# Patient Record
Sex: FEMALE | Race: WHITE | Employment: STUDENT | URBAN - METROPOLITAN AREA
[De-identification: names, ages, dates, MRNs, and addresses within clinical notes are randomized per-mention and may not be internally consistent; named-entity substitution may affect disease eponyms.]

---
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
## 2021-05-08 ENCOUNTER — IMMUNIZATION (OUTPATIENT)
Dept: LAB | Facility: HOSPITAL | Age: 21
End: 2021-05-08
Attending: EMERGENCY MEDICINE
Payer: COMMERCIAL

## 2021-05-08 DIAGNOSIS — Z23 NEED FOR VACCINATION: Primary | ICD-10-CM

## 2021-05-08 PROCEDURE — 0001A SARSCOV2 VAC 30MCG/0.3ML IM: CPT

## 2021-05-29 ENCOUNTER — IMMUNIZATION (OUTPATIENT)
Dept: LAB | Facility: HOSPITAL | Age: 21
End: 2021-05-29
Attending: EMERGENCY MEDICINE
Payer: COMMERCIAL

## 2021-05-29 DIAGNOSIS — Z23 NEED FOR VACCINATION: Primary | ICD-10-CM

## 2021-05-29 PROCEDURE — 0002A SARSCOV2 VAC 30MCG/0.3ML IM: CPT

## 2021-06-14 ENCOUNTER — TELEPHONE (OUTPATIENT)
Dept: OBGYN CLINIC | Facility: CLINIC | Age: 21
End: 2021-06-14

## 2021-06-14 NOTE — TELEPHONE ENCOUNTER
Mom calling and says Pilonidal cyst is back  Advised she is 21 years now and should find an adult surgeon    Please advise if something different than that?

## 2021-06-17 ENCOUNTER — OFFICE VISIT (OUTPATIENT)
Dept: SURGERY | Facility: CLINIC | Age: 21
End: 2021-06-17
Payer: COMMERCIAL

## 2021-06-17 VITALS
TEMPERATURE: 98 F | HEART RATE: 90 BPM | SYSTOLIC BLOOD PRESSURE: 105 MMHG | HEIGHT: 62 IN | BODY MASS INDEX: 19.32 KG/M2 | DIASTOLIC BLOOD PRESSURE: 75 MMHG | WEIGHT: 105 LBS

## 2021-06-17 DIAGNOSIS — L05.91 PILONIDAL CYST: ICD-10-CM

## 2021-06-17 DIAGNOSIS — L98.8 PILONIDAL DISEASE: Primary | ICD-10-CM

## 2021-06-17 PROCEDURE — 3078F DIAST BP <80 MM HG: CPT | Performed by: SURGERY

## 2021-06-17 PROCEDURE — 3074F SYST BP LT 130 MM HG: CPT | Performed by: SURGERY

## 2021-06-17 PROCEDURE — 99244 OFF/OP CNSLTJ NEW/EST MOD 40: CPT | Performed by: SURGERY

## 2021-06-17 PROCEDURE — 3008F BODY MASS INDEX DOCD: CPT | Performed by: SURGERY

## 2021-06-17 RX ORDER — CEPHALEXIN 500 MG/1
CAPSULE ORAL
COMMUNITY

## 2021-06-17 NOTE — H&P
New Patient Visit Note       Active Problems      1. Pilonidal disease    2. Pilonidal cyst        Chief Complaint   Patient presents with:  Pilonidal Cyst: NP pilonidal cyst- PT states has a hx of pilonidal cyst, last one was 2 years ago.  PT states last w for pain, discharge, redness and visual disturbance. Respiratory: Negative for cough, chest tightness, shortness of breath and wheezing. Cardiovascular: Negative for chest pain, palpitations and leg swelling.    Gastrointestinal: Negative for abdominal Tenderness: There is no abdominal tenderness. There is no guarding or rebound. Musculoskeletal:         General: No deformity. Normal range of motion. Cervical back: Normal range of motion and neck supple. No rigidity.       Right lower leg: No edema encouraged to avoid prolonged sitting and other activities which may aggravate this discomfort.   The pain may be related to scar tissue or just prolonged sitting    No indication for general surgical intervention at this time    Assessment  Pilonidal disea

## 2022-01-12 ENCOUNTER — IMMUNIZATION (OUTPATIENT)
Dept: LAB | Facility: HOSPITAL | Age: 22
End: 2022-01-12
Attending: EMERGENCY MEDICINE
Payer: COMMERCIAL

## 2022-01-12 DIAGNOSIS — Z23 NEED FOR VACCINATION: Primary | ICD-10-CM

## 2022-01-12 PROCEDURE — 0004A SARSCOV2 VAC 30MCG/0.3ML IM: CPT | Performed by: NURSE PRACTITIONER

## 2022-01-12 PROCEDURE — 0054A SARSCOV2 VAC 30MCG/0.3ML IM: CPT | Performed by: NURSE PRACTITIONER

## 2022-07-02 ENCOUNTER — OFFICE VISIT (OUTPATIENT)
Dept: FAMILY MEDICINE CLINIC | Facility: CLINIC | Age: 22
End: 2022-07-02
Payer: COMMERCIAL

## 2022-07-02 VITALS
DIASTOLIC BLOOD PRESSURE: 66 MMHG | HEART RATE: 64 BPM | SYSTOLIC BLOOD PRESSURE: 90 MMHG | TEMPERATURE: 99 F | RESPIRATION RATE: 16 BRPM

## 2022-07-02 DIAGNOSIS — L30.9 DERMATITIS: Primary | ICD-10-CM

## 2022-07-02 PROCEDURE — 3078F DIAST BP <80 MM HG: CPT | Performed by: NURSE PRACTITIONER

## 2022-07-02 PROCEDURE — 3074F SYST BP LT 130 MM HG: CPT | Performed by: NURSE PRACTITIONER

## 2022-07-02 PROCEDURE — 99213 OFFICE O/P EST LOW 20 MIN: CPT | Performed by: NURSE PRACTITIONER

## 2022-07-02 RX ORDER — TRIAMCINOLONE ACETONIDE 5 MG/G
OINTMENT TOPICAL
Qty: 15 G | Refills: 1 | Status: SHIPPED | OUTPATIENT
Start: 2022-07-02

## 2022-07-02 RX ORDER — KETOCONAZOLE 20 MG/G
CREAM TOPICAL
Qty: 15 G | Refills: 0 | Status: SHIPPED | OUTPATIENT
Start: 2022-07-02

## 2023-07-31 ENCOUNTER — OFFICE VISIT (OUTPATIENT)
Dept: FAMILY MEDICINE CLINIC | Facility: CLINIC | Age: 23
End: 2023-07-31
Payer: COMMERCIAL

## 2023-07-31 VITALS
RESPIRATION RATE: 16 BRPM | WEIGHT: 110 LBS | BODY MASS INDEX: 20.24 KG/M2 | HEIGHT: 62 IN | OXYGEN SATURATION: 100 % | HEART RATE: 70 BPM | SYSTOLIC BLOOD PRESSURE: 103 MMHG | DIASTOLIC BLOOD PRESSURE: 63 MMHG | TEMPERATURE: 99 F

## 2023-07-31 DIAGNOSIS — B08.4 HAND, FOOT AND MOUTH DISEASE: Primary | ICD-10-CM

## 2023-07-31 DIAGNOSIS — J02.9 SORE THROAT: ICD-10-CM

## 2023-07-31 LAB
CONTROL LINE PRESENT WITH A CLEAR BACKGROUND (YES/NO): YES YES/NO
KIT LOT #: NORMAL NUMERIC
STREP GRP A CUL-SCR: NEGATIVE

## 2023-07-31 NOTE — PATIENT INSTRUCTIONS
Use otc pain reliever and warm saltwater gargles for sores in the throat. Take zyrtec in the day and/or benadryl at night for itching as needed. If symptoms worsen or persist, follow-up with PCP for further evaluation.

## 2024-10-31 ENCOUNTER — OFFICE VISIT (OUTPATIENT)
Dept: FAMILY MEDICINE CLINIC | Facility: CLINIC | Age: 24
End: 2024-10-31
Payer: COMMERCIAL

## 2024-10-31 ENCOUNTER — LAB ENCOUNTER (OUTPATIENT)
Dept: LAB | Age: 24
End: 2024-10-31
Attending: STUDENT IN AN ORGANIZED HEALTH CARE EDUCATION/TRAINING PROGRAM
Payer: COMMERCIAL

## 2024-10-31 VITALS
SYSTOLIC BLOOD PRESSURE: 106 MMHG | WEIGHT: 107 LBS | BODY MASS INDEX: 19.69 KG/M2 | HEIGHT: 62 IN | RESPIRATION RATE: 14 BRPM | HEART RATE: 66 BPM | DIASTOLIC BLOOD PRESSURE: 68 MMHG

## 2024-10-31 DIAGNOSIS — R42 LIGHT HEADEDNESS: ICD-10-CM

## 2024-10-31 DIAGNOSIS — Z00.00 ENCOUNTER FOR ROUTINE HISTORY AND PHYSICAL EXAMINATION: Primary | ICD-10-CM

## 2024-10-31 DIAGNOSIS — Z00.00 LABORATORY EXAM ORDERED AS PART OF ROUTINE GENERAL MEDICAL EXAMINATION: ICD-10-CM

## 2024-10-31 DIAGNOSIS — R73.01 ELEVATED FASTING GLUCOSE: ICD-10-CM

## 2024-10-31 LAB
ALBUMIN SERPL-MCNC: 4.7 G/DL (ref 3.2–4.8)
ALBUMIN/GLOB SERPL: 2 {RATIO} (ref 1–2)
ALP LIVER SERPL-CCNC: 81 U/L
ALT SERPL-CCNC: 14 U/L
ANION GAP SERPL CALC-SCNC: 9 MMOL/L (ref 0–18)
AST SERPL-CCNC: 20 U/L (ref ?–34)
BASOPHILS # BLD AUTO: 0.02 X10(3) UL (ref 0–0.2)
BASOPHILS NFR BLD AUTO: 0.3 %
BILIRUB SERPL-MCNC: 0.7 MG/DL (ref 0.3–1.2)
BUN BLD-MCNC: 12 MG/DL (ref 9–23)
CALCIUM BLD-MCNC: 10.1 MG/DL (ref 8.7–10.4)
CHLORIDE SERPL-SCNC: 109 MMOL/L (ref 98–112)
CHOLEST SERPL-MCNC: 117 MG/DL (ref ?–200)
CO2 SERPL-SCNC: 23 MMOL/L (ref 21–32)
CREAT BLD-MCNC: 1.01 MG/DL
EGFRCR SERPLBLD CKD-EPI 2021: 80 ML/MIN/1.73M2 (ref 60–?)
EOSINOPHIL # BLD AUTO: 0.1 X10(3) UL (ref 0–0.7)
EOSINOPHIL NFR BLD AUTO: 1.5 %
ERYTHROCYTE [DISTWIDTH] IN BLOOD BY AUTOMATED COUNT: 12.5 %
FASTING PATIENT LIPID ANSWER: YES
FASTING STATUS PATIENT QL REPORTED: YES
GLOBULIN PLAS-MCNC: 2.4 G/DL (ref 2–3.5)
GLUCOSE BLD-MCNC: 116 MG/DL (ref 70–99)
HCT VFR BLD AUTO: 39.8 %
HDLC SERPL-MCNC: 52 MG/DL (ref 40–59)
HGB BLD-MCNC: 14.2 G/DL
IMM GRANULOCYTES # BLD AUTO: 0.02 X10(3) UL (ref 0–1)
IMM GRANULOCYTES NFR BLD: 0.3 %
LDLC SERPL CALC-MCNC: 52 MG/DL (ref ?–100)
LYMPHOCYTES # BLD AUTO: 2.05 X10(3) UL (ref 1–4)
LYMPHOCYTES NFR BLD AUTO: 29.8 %
MCH RBC QN AUTO: 31.1 PG (ref 26–34)
MCHC RBC AUTO-ENTMCNC: 35.7 G/DL (ref 31–37)
MCV RBC AUTO: 87.1 FL
MONOCYTES # BLD AUTO: 0.56 X10(3) UL (ref 0.1–1)
MONOCYTES NFR BLD AUTO: 8.1 %
NEUTROPHILS # BLD AUTO: 4.14 X10 (3) UL (ref 1.5–7.7)
NEUTROPHILS # BLD AUTO: 4.14 X10(3) UL (ref 1.5–7.7)
NEUTROPHILS NFR BLD AUTO: 60 %
NONHDLC SERPL-MCNC: 65 MG/DL (ref ?–130)
OSMOLALITY SERPL CALC.SUM OF ELEC: 293 MOSM/KG (ref 275–295)
PLATELET # BLD AUTO: 240 10(3)UL (ref 150–450)
POTASSIUM SERPL-SCNC: 4.2 MMOL/L (ref 3.5–5.1)
PROT SERPL-MCNC: 7.1 G/DL (ref 5.7–8.2)
RBC # BLD AUTO: 4.57 X10(6)UL
SODIUM SERPL-SCNC: 141 MMOL/L (ref 136–145)
TRIGL SERPL-MCNC: 61 MG/DL (ref 30–149)
TSI SER-ACNC: 1.76 MIU/ML (ref 0.55–4.78)
VLDLC SERPL CALC-MCNC: 9 MG/DL (ref 0–30)
WBC # BLD AUTO: 6.9 X10(3) UL (ref 4–11)

## 2024-10-31 PROCEDURE — 80061 LIPID PANEL: CPT | Performed by: STUDENT IN AN ORGANIZED HEALTH CARE EDUCATION/TRAINING PROGRAM

## 2024-10-31 PROCEDURE — 80050 GENERAL HEALTH PANEL: CPT | Performed by: STUDENT IN AN ORGANIZED HEALTH CARE EDUCATION/TRAINING PROGRAM

## 2024-10-31 RX ORDER — DESONIDE 0.5 MG/G
OINTMENT TOPICAL
COMMUNITY
Start: 2024-10-30

## 2024-10-31 NOTE — PROGRESS NOTES
[facility]    CC: yearly exam     HPI: Yari Weeks is 24 year old female here for a yearly physical.    Also would like to discuss the following:    .Episodes of lightheadedness. Occasional and intermittent. Has been going on for >10 years. 1-2x/month. After lying down for a little while symptoms resolve. She feels this occurs when she is on her menstrual cycle. Sometimes happens after first waking up or if she has not eaten well.     PMH:  Patient Active Problem List   Diagnosis    Acute bilateral low back pain without sciatica    Upper back pain, chronic    Pilonidal disease     Past Medical History:    Back problem    mild scoliosis    Community acquired pneumonia    Pneumonia in pediatric patient    Visual impairment    Wrist sprain, right, initial encounter       Last Physical 10/31/24     SH: reviewed     FH: reviewed     Social History     Social History Narrative    Works in finance. Lives at home with Parents, brother and sister. No tobacco. Occaisional alcohol. No drug use.        ROS: full 10 point review of systems done and otherwise negative.      Healthcare Maintenance:  Pap: pt has scheduled with gyn  Declines flu  Covid19 booster recommended  Tdap recommended, declines today    Health Habits:  Smoking. declines  Alcohol Use. occasional  Dental Exam. UTD  Eye Exam. UTD     GYN:  Birth Control. abstinence  Menstrual/Menopausal Hx. Regular/manageable     PE:  Vital Signs    10/31/24 1330   BP: 106/68   Pulse: 66   Resp: 14     Wt Readings from Last 3 Encounters:   10/31/24 107 lb (48.5 kg)   07/31/23 110 lb (49.9 kg)   06/17/21 105 lb (47.6 kg)     Body mass index is 19.57 kg/m².     General: Comfortable, pleasant, NAD, appears stated age  HEENT.  NC/AT, no conjunctival injection, TMs clear, oropharynx without erythema or exudate, neck supple, no LAD or thyromegaly  CV.  RRR, no m/r/g  Pulm. CTAB, no W/R/R, comfortable work of breathing, speaks in full sentences  Abdomen.  Soft, nt, nd  .  deferred  Extremities.  2+ DP pulses, no edema  Skin.  No concerning moles      EE Lab Encounter on 10/31/2024   Component Date Value Ref Range Status    WBC 10/31/2024 6.9  4.0 - 11.0 x10(3) uL Final    RBC 10/31/2024 4.57  3.80 - 5.30 x10(6)uL Final    HGB 10/31/2024 14.2  12.0 - 16.0 g/dL Final    HCT 10/31/2024 39.8  35.0 - 48.0 % Final    PLT 10/31/2024 240.0  150.0 - 450.0 10(3)uL Final    MCV 10/31/2024 87.1  80.0 - 100.0 fL Final    MCH 10/31/2024 31.1  26.0 - 34.0 pg Final    MCHC 10/31/2024 35.7  31.0 - 37.0 g/dL Final    RDW 10/31/2024 12.5  % Final    Neutrophil Absolute Prelim 10/31/2024 4.14  1.50 - 7.70 x10 (3) uL Final    Neutrophil Absolute 10/31/2024 4.14  1.50 - 7.70 x10(3) uL Final    Lymphocyte Absolute 10/31/2024 2.05  1.00 - 4.00 x10(3) uL Final    Monocyte Absolute 10/31/2024 0.56  0.10 - 1.00 x10(3) uL Final    Eosinophil Absolute 10/31/2024 0.10  0.00 - 0.70 x10(3) uL Final    Basophil Absolute 10/31/2024 0.02  0.00 - 0.20 x10(3) uL Final    Immature Granulocyte Absolute 10/31/2024 0.02  0.00 - 1.00 x10(3) uL Final    Neutrophil % 10/31/2024 60.0  % Final    Lymphocyte % 10/31/2024 29.8  % Final    Monocyte % 10/31/2024 8.1  % Final    Eosinophil % 10/31/2024 1.5  % Final    Basophil % 10/31/2024 0.3  % Final    Immature Granulocyte % 10/31/2024 0.3  % Final    Glucose 10/31/2024 116 (H)  70 - 99 mg/dL Final    Sodium 10/31/2024 141  136 - 145 mmol/L Final    Potassium 10/31/2024 4.2  3.5 - 5.1 mmol/L Final    Chloride 10/31/2024 109  98 - 112 mmol/L Final    CO2 10/31/2024 23.0  21.0 - 32.0 mmol/L Final    Anion Gap 10/31/2024 9  0 - 18 mmol/L Final    BUN 10/31/2024 12  9 - 23 mg/dL Final    Creatinine 10/31/2024 1.01  0.55 - 1.02 mg/dL Final    Calcium, Total 10/31/2024 10.1  8.7 - 10.4 mg/dL Final    Calculated Osmolality 10/31/2024 293  275 - 295 mOsm/kg Final    eGFR-Cr 10/31/2024 80  >=60 mL/min/1.73m2 Final    AST 10/31/2024 20  <34 U/L Final    ALT 10/31/2024 14   10 - 49 U/L Final    Alkaline Phosphatase 10/31/2024 81  52 - 144 U/L Final    Bilirubin, Total 10/31/2024 0.7  0.3 - 1.2 mg/dL Final    Total Protein 10/31/2024 7.1  5.7 - 8.2 g/dL Final    Albumin 10/31/2024 4.7  3.2 - 4.8 g/dL Final    Globulin  10/31/2024 2.4  2.0 - 3.5 g/dL Final    A/G Ratio 10/31/2024 2.0  1.0 - 2.0 Final    Patient Fasting for CMP? 10/31/2024 Yes   Final    Cholesterol, Total 10/31/2024 117  <200 mg/dL Final    HDL Cholesterol 10/31/2024 52  40 - 59 mg/dL Final    Triglycerides 10/31/2024 61  30 - 149 mg/dL Final    LDL Cholesterol 10/31/2024 52  <100 mg/dL Final    VLDL 10/31/2024 9  0 - 30 mg/dL Final    Non HDL Chol 10/31/2024 65  <130 mg/dL Final    Patient Fasting for Lipid? 10/31/2024 Yes   Final    TSH 10/31/2024 1.763  0.550 - 4.780 mIU/mL Final        A/P: Yari Weeks is 24 year old yo female here for complete physical.  1. Healthcare Maintenance. Discussed eye exam, dentist visit q6 months, sunscreen, exercise at least 5x/week, 30 minutes daily, and limiting caffeine intake.     2. Intermittent light headedness - recommend eating breakfast every day, staying hydrated.   - CBC W Differential W Platelet [E]; Future  - Comp Metabolic Panel (14) [E]; Future  - Lipid Panel [E]; Future  - TSH W REFLEX TO FREE T4 [13281][Q]; Future  - Hemoglobin A1C [E]; Future     Encounter Medications[1]        Side effects, risks and benefits of medications were explained.  The patient or responsible adult showed the ability to learn, asked appropriate questions.  There were no barriers to learning and they verbalized understanding of the treatment plan.     Medication list provided to patient and /or family member.     This note was created in part by Dragon recognition software.  Please excuse errors.                 [1]   Outpatient Encounter Medications as of 10/31/2024   Medication Sig Dispense Refill    Desonide 0.05 % External Ointment Apply to affected areas twice a day for 1-2 weeks,  then hold, then restart as needed.      ketoconazole 2 % External Cream Apply to rash behind left knee tid until resolved 15 g 0    clindamycin 1 % External Solution Apply to AA of scalp BID 60 mL 2    ibuprofen (ADVIL) 200 MG Oral Tab Take 3 tablets (600 mg total) by mouth every 6 (six) hours as needed for Pain.      Multiple Vitamin (MULTI-DAY) Oral Tab Take by mouth.      [DISCONTINUED] triamcinolone 0.5 % External Ointment Apply to rash behind the left knee tid until resolved.  Do not use for more than 2 weeks. (Patient not taking: Reported on 10/31/2024) 15 g 1     No facility-administered encounter medications on file as of 10/31/2024.

## 2025-01-15 ENCOUNTER — LAB ENCOUNTER (OUTPATIENT)
Dept: LAB | Age: 25
End: 2025-01-15
Attending: STUDENT IN AN ORGANIZED HEALTH CARE EDUCATION/TRAINING PROGRAM
Payer: COMMERCIAL

## 2025-01-15 DIAGNOSIS — L73.8 SENILE SEBACEOUS GLAND HYPERPLASIA: ICD-10-CM

## 2025-01-15 DIAGNOSIS — Z79.899 NEED FOR PROPHYLACTIC CHEMOTHERAPY: Primary | ICD-10-CM

## 2025-01-15 DIAGNOSIS — Z76.89 REFERRAL OF PATIENT WITHOUT EXAMINATION OR TREATMENT: ICD-10-CM

## 2025-01-15 LAB
ALBUMIN SERPL-MCNC: 4.8 G/DL (ref 3.2–4.8)
ALBUMIN/GLOB SERPL: 1.8 {RATIO} (ref 1–2)
ALP LIVER SERPL-CCNC: 90 U/L
ALT SERPL-CCNC: 14 U/L
ANION GAP SERPL CALC-SCNC: 9 MMOL/L (ref 0–18)
AST SERPL-CCNC: 21 U/L (ref ?–34)
BILIRUB SERPL-MCNC: 0.5 MG/DL (ref 0.3–1.2)
BUN BLD-MCNC: 13 MG/DL (ref 9–23)
CALCIUM BLD-MCNC: 10 MG/DL (ref 8.7–10.6)
CHLORIDE SERPL-SCNC: 110 MMOL/L (ref 98–112)
CHOLEST SERPL-MCNC: 140 MG/DL (ref ?–200)
CO2 SERPL-SCNC: 24 MMOL/L (ref 21–32)
CREAT BLD-MCNC: 0.96 MG/DL
EGFRCR SERPLBLD CKD-EPI 2021: 85 ML/MIN/1.73M2 (ref 60–?)
FASTING PATIENT LIPID ANSWER: YES
FASTING STATUS PATIENT QL REPORTED: YES
GLOBULIN PLAS-MCNC: 2.6 G/DL (ref 2–3.5)
GLUCOSE BLD-MCNC: 91 MG/DL (ref 70–99)
HDLC SERPL-MCNC: 59 MG/DL (ref 40–59)
LDLC SERPL CALC-MCNC: 72 MG/DL (ref ?–100)
NONHDLC SERPL-MCNC: 81 MG/DL (ref ?–130)
OSMOLALITY SERPL CALC.SUM OF ELEC: 296 MOSM/KG (ref 275–295)
POTASSIUM SERPL-SCNC: 4.3 MMOL/L (ref 3.5–5.1)
PROT SERPL-MCNC: 7.4 G/DL (ref 5.7–8.2)
SODIUM SERPL-SCNC: 143 MMOL/L (ref 136–145)
TRIGL SERPL-MCNC: 39 MG/DL (ref 30–149)
VLDLC SERPL CALC-MCNC: 6 MG/DL (ref 0–30)

## 2025-01-15 PROCEDURE — 80061 LIPID PANEL: CPT

## 2025-01-15 PROCEDURE — 36415 COLL VENOUS BLD VENIPUNCTURE: CPT

## 2025-01-15 PROCEDURE — 80053 COMPREHEN METABOLIC PANEL: CPT

## 2025-02-08 ENCOUNTER — LAB ENCOUNTER (OUTPATIENT)
Dept: LAB | Age: 25
End: 2025-02-08
Attending: STUDENT IN AN ORGANIZED HEALTH CARE EDUCATION/TRAINING PROGRAM
Payer: COMMERCIAL

## 2025-02-08 DIAGNOSIS — Z76.89 REFERRAL OF PATIENT WITHOUT EXAMINATION OR TREATMENT: ICD-10-CM

## 2025-02-08 DIAGNOSIS — L57.8 NODULAR ELASTOSIS WITH CYSTS AND COMEDONES OF FAVRE AND RACOUCHOT: Primary | ICD-10-CM

## 2025-02-08 DIAGNOSIS — L70.0 NODULAR ELASTOSIS WITH CYSTS AND COMEDONES OF FAVRE AND RACOUCHOT: Primary | ICD-10-CM

## 2025-02-08 DIAGNOSIS — Z79.899 NEED FOR PROPHYLACTIC CHEMOTHERAPY: ICD-10-CM

## 2025-02-08 LAB
ALBUMIN SERPL-MCNC: 5 G/DL (ref 3.2–4.8)
ALBUMIN/GLOB SERPL: 1.9 {RATIO} (ref 1–2)
ALP LIVER SERPL-CCNC: 94 U/L
ALT SERPL-CCNC: 15 U/L
ANION GAP SERPL CALC-SCNC: 9 MMOL/L (ref 0–18)
AST SERPL-CCNC: 25 U/L (ref ?–34)
BILIRUB SERPL-MCNC: 0.8 MG/DL (ref 0.3–1.2)
BUN BLD-MCNC: 13 MG/DL (ref 9–23)
CALCIUM BLD-MCNC: 9.7 MG/DL (ref 8.7–10.6)
CHLORIDE SERPL-SCNC: 107 MMOL/L (ref 98–112)
CHOLEST SERPL-MCNC: 141 MG/DL (ref ?–200)
CO2 SERPL-SCNC: 24 MMOL/L (ref 21–32)
CREAT BLD-MCNC: 1.02 MG/DL
EGFRCR SERPLBLD CKD-EPI 2021: 79 ML/MIN/1.73M2 (ref 60–?)
FASTING PATIENT LIPID ANSWER: YES
FASTING STATUS PATIENT QL REPORTED: YES
GLOBULIN PLAS-MCNC: 2.6 G/DL (ref 2–3.5)
GLUCOSE BLD-MCNC: 94 MG/DL (ref 70–99)
HDLC SERPL-MCNC: 61 MG/DL (ref 40–59)
LDLC SERPL CALC-MCNC: 67 MG/DL (ref ?–100)
NONHDLC SERPL-MCNC: 80 MG/DL (ref ?–130)
OSMOLALITY SERPL CALC.SUM OF ELEC: 290 MOSM/KG (ref 275–295)
POTASSIUM SERPL-SCNC: 4.1 MMOL/L (ref 3.5–5.1)
PROT SERPL-MCNC: 7.6 G/DL (ref 5.7–8.2)
SODIUM SERPL-SCNC: 140 MMOL/L (ref 136–145)
TRIGL SERPL-MCNC: 65 MG/DL (ref 30–149)
VLDLC SERPL CALC-MCNC: 10 MG/DL (ref 0–30)

## 2025-02-08 PROCEDURE — 80053 COMPREHEN METABOLIC PANEL: CPT

## 2025-02-08 PROCEDURE — 36415 COLL VENOUS BLD VENIPUNCTURE: CPT

## 2025-02-08 PROCEDURE — 80061 LIPID PANEL: CPT

## (undated) DEVICE — GOWN,SIRUS,FABRIC-REINFORCED,X-LARGE: Brand: MEDLINE

## (undated) DEVICE — GAMMEX® PI HYBRID SIZE 6.5, STERILE POWDER-FREE SURGICAL GLOVE, POLYISOPRENE AND NEOPRENE BLEND: Brand: GAMMEX

## (undated) DEVICE — PUNCH DERMAL 4MM

## (undated) DEVICE — GLOVE BIOGEL M SURG SZ 7

## (undated) DEVICE — SOL  .9 1000ML BTL

## (undated) DEVICE — SPONGE STICK WITH PVP-I: Brand: KENDALL

## (undated) DEVICE — REM POLYHESIVE ADULT PATIENT RETURN ELECTRODE: Brand: VALLEYLAB

## (undated) DEVICE — ABDOMINAL PAD: Brand: DERMACEA

## (undated) DEVICE — LUBRICANT JLY SURGILUBE 2OZ

## (undated) DEVICE — GAUZE PACKING IODOFORM 1/4X5

## (undated) DEVICE — 1010 S-DRAPE TOWEL DRAPE 10/BX: Brand: STERI-DRAPE™

## (undated) DEVICE — PEDIATRIC: Brand: MEDLINE INDUSTRIES, INC.

## (undated) DEVICE — GAUZE SPONGES,12 PLY: Brand: CURITY

## (undated) NOTE — LETTER
[facility]  Lamberto Stapleton 62 [address] [facility], [address] [place] [address]    Consent for Operation    Date: __________________    Time: _______________    1.  I authorize the performance upon Maria Guadalupe Hester the following operation:    Procedure(s):  SHAINA AND Alisha Ying procedure has been videotaped, the surgeon will obtain the original videotape. The hospital will not be responsible for storage or maintenance of this tape.     6. For the purpose of advancing medical education, I consent to the admittance of observers to t STATEMENTS REQUIRING INSERTION OR COMPLETION WERE FILLED IN.     Signature of Patient:   ___________________________    When the patient is a minor or mentally incompetent to give consent:  Signature of person authorized to consent for patient: ____________ drugs/illegal medications). Failure to inform my anesthesiologist about these medicines may increase my risk of anesthetic complications. · If I am allergic to anything or have had a reaction to anesthesia before.     3. I understand how the anesthesia med I have discussed the procedure and information above with the patient (or patient’s representative) and answered their questions. The patient or their representative has agreed to have anesthesia services.     _______________________________________________

## (undated) NOTE — LETTER
21    Patient: Anju Agarwal  : 2000 Visit date: 2021    Dear  Ashtyn Gonzales MD    Thank you for referring Anju Agarwal to my practice. Please find my assessment and plan below.        History of Present Illness   Km Lewis

## (undated) NOTE — LETTER
17    Patient: Latosha Cornell  : 2000 Visit date: 2017    To whom this may concern,    Latosha Cornell is a current patient in our practice.  She is able to resume all activities, including Physical Education, with no restrictions as of

## (undated) NOTE — LETTER
19    Patient: Guillermo Oliver  : 2000 Visit date: 2019    Dear  Dr. Bienvenido Ruggiero MD,    Today it was my pleasure to see Guillermo Oliver, 26year old in the Pediatric Surgery Clinic at [facility].  Please see my attached clinic note, Smoking status: Never Smoker      Smokeless tobacco: Never Used    Substance and Sexual Activity      Alcohol use: No      Drug use: No      Meds & Allergies:    Current Outpatient Medications:  Multiple Vitamin (MULTI-DAY) Oral Tab Take by mouth.  Disp · Signs of recurrence: pain, swelling, tenderness, redness and/or drainage  · Continue sitz baths, may use Epsom salts to reduce inflammation  · RTC at next school break    No orders of the defined types were placed in this encounter.       [facility]

## (undated) NOTE — LETTER
21    Patient: Antonietta Watkins  : 2000 Visit date: 2021    Dear  Wm Guerrero MD    Thank you for referring Antonietta Watkins to my practice. Please find my assessment and plan below.        History of Present Illness   Swheta

## (undated) NOTE — LETTER
Date: 11/15/2017    Patient Name: Yareli Springer          To Whom it may concern: This letter has been written at the patient's request. The above patient was seen at the [facility] for treatment of a medical condition.     The patient may

## (undated) NOTE — LETTER
19    Patient: Prasanth Eduardo  : 2000 Visit date: 2019    Dear  Dr. Karen Tobar MD,    Today it was my pleasure to see Prasanth Eduardo, 26year old in the Pediatric Surgery Clinic at [facility].  Please see my attached clinic note, rachel Hematological: Does not bruise/bleed easily. All other systems reviewed and are negative. Physical Exam:   Vital Signs:  Blood pressure 97/60, pulse 90, temperature 98.8 °F (37.1 °C), temperature source Oral, resp.  rate 18, height 5' 2.01\" (1.575 m CC: No Recipients

## (undated) NOTE — LETTER
17    Patient: Safia Crawford  : 2000 Visit date: 2017    Dear  Dr. Jeevan Villatoro MD,    Today it was my pleasure to see Safia Yvette, 13year old in the Pediatric Surgery Clinic at [facility].  Please see my attached clinic note, bel cm bulge noted at gluteal cleft, tender to palpation, no erythema, no drainage. The extremities are pink and all four move well. Assessment  In summary, Nathalie Chinchilla is a 13year old female with a pilonidal cyst.    No diagnosis found.     Plan   · O

## (undated) NOTE — LETTER
17    Patient: Chris Richards  : 2000 Visit date: 2017    Dear  Dr. Erika Ivan MD,    Today it was my pleasure to see Chris Richards, 13year old in the Pediatric Surgery Clinic at [facility].  Please see my attached clinic note, bel Wt 108 lb 14.4 oz (49.4 kg)   LMP 11/07/2017   BMI 19.92 kg/m²       Ivette Board is alert. The heart is regular rate and rhythm. The lungs are clear to auscultation bilaterally.   The abdomen is soft, non tender, and non-distended with no hepatosplenom